# Patient Record
Sex: MALE | Race: WHITE | Employment: PART TIME | URBAN - METROPOLITAN AREA
[De-identification: names, ages, dates, MRNs, and addresses within clinical notes are randomized per-mention and may not be internally consistent; named-entity substitution may affect disease eponyms.]

---

## 2020-10-29 NOTE — ED INITIAL ASSESSMENT (HPI)
Patient took 3 tabs of LSD at 2300 and feels like his heart is racing. Denies drug use in the past. Denies alcohol this evening.  Patient is visibly anxious during triage

## 2020-10-29 NOTE — ED PROVIDER NOTES
Patient Seen in: [facility] Emergency Department      History   Patient presents with:  Arrythmia/Palpitations    Stated Complaint: heart racing    HPI    55-year-old male brought in by sister for complaints of heart racing and palpitations for th 180.3 cm (5' 11\")   Wt 61.7 kg   SpO2 96%   BMI 18.97 kg/m²         Physical Exam  Vitals signs and nursing note reviewed. HENT:      Head: Normocephalic.       Mouth/Throat:      Mouth: Mucous membranes are moist.   Eyes:      Extraocular Movements: Ext 8.9 8.5 - 10.1 mg/dL    Calculated Osmolality 273 (L) 275 - 295 mOsm/kg    GFR, Non- 110 >=60    GFR, -American 127 >=60   CBC W/ DIFFERENTIAL    Collection Time: 10/29/20  4:00 AM   Result Value Ref Range    WBC 9.7 4.0 - 11.0 x10(3 The patient already has does not have a problem list on file. to contribute to the complexity of his ED evaluation.     - pt comfortable with d/c at this time, will d/c pt home now, pt to f/u with Dr. Aura Guevara in 2 days or return to ED sooner if symptoms wo

## 2020-11-25 ENCOUNTER — OFFICE VISIT (OUTPATIENT)
Dept: OTOLARYNGOLOGY | Facility: CLINIC | Age: 25
End: 2020-11-25

## 2020-11-25 VITALS
WEIGHT: 137 LBS | SYSTOLIC BLOOD PRESSURE: 118 MMHG | BODY MASS INDEX: 19.18 KG/M2 | TEMPERATURE: 98 F | DIASTOLIC BLOOD PRESSURE: 78 MMHG | HEIGHT: 71 IN

## 2020-11-25 DIAGNOSIS — M54.2 CERVICALGIA: ICD-10-CM

## 2020-11-25 DIAGNOSIS — H60.311 ACUTE DIFFUSE OTITIS EXTERNA OF RIGHT EAR: Primary | ICD-10-CM

## 2020-11-25 PROCEDURE — 99203 OFFICE O/P NEW LOW 30 MIN: CPT | Performed by: OTOLARYNGOLOGY

## 2020-11-25 PROCEDURE — 3074F SYST BP LT 130 MM HG: CPT | Performed by: OTOLARYNGOLOGY

## 2020-11-25 PROCEDURE — 3008F BODY MASS INDEX DOCD: CPT | Performed by: OTOLARYNGOLOGY

## 2020-11-25 PROCEDURE — 3078F DIAST BP <80 MM HG: CPT | Performed by: OTOLARYNGOLOGY

## 2020-11-25 RX ORDER — IBUPROFEN 800 MG/1
800 TABLET ORAL EVERY 8 HOURS PRN
COMMUNITY
Start: 2020-11-16

## 2020-11-25 RX ORDER — NEOMYCIN SULFATE, POLYMYXIN B SULFATE AND HYDROCORTISONE 10; 3.5; 1 MG/ML; MG/ML; [USP'U]/ML
3 SUSPENSION/ DROPS AURICULAR (OTIC) 3 TIMES DAILY
Qty: 10 ML | Refills: 1 | Status: SHIPPED | OUTPATIENT
Start: 2020-11-25 | End: 2020-12-05

## 2020-11-25 RX ORDER — METHYLPREDNISOLONE 4 MG/1
TABLET ORAL
Qty: 1 PACKAGE | Refills: 0 | Status: SHIPPED | OUTPATIENT
Start: 2020-11-25

## 2020-12-02 ENCOUNTER — OFFICE VISIT (OUTPATIENT)
Dept: OTOLARYNGOLOGY | Facility: CLINIC | Age: 25
End: 2020-12-02

## 2020-12-02 VITALS
TEMPERATURE: 97 F | DIASTOLIC BLOOD PRESSURE: 80 MMHG | HEIGHT: 71 IN | WEIGHT: 137 LBS | SYSTOLIC BLOOD PRESSURE: 118 MMHG | BODY MASS INDEX: 19.18 KG/M2

## 2020-12-02 DIAGNOSIS — M54.2 CERVICALGIA: Primary | ICD-10-CM

## 2020-12-02 PROCEDURE — 99213 OFFICE O/P EST LOW 20 MIN: CPT | Performed by: OTOLARYNGOLOGY

## 2020-12-02 PROCEDURE — 3074F SYST BP LT 130 MM HG: CPT | Performed by: OTOLARYNGOLOGY

## 2020-12-02 PROCEDURE — 3079F DIAST BP 80-89 MM HG: CPT | Performed by: OTOLARYNGOLOGY

## 2020-12-02 PROCEDURE — 3008F BODY MASS INDEX DOCD: CPT | Performed by: OTOLARYNGOLOGY

## 2020-12-02 NOTE — PROGRESS NOTES
Yuval Juárez is a 22year old male. Patient presents with:   Follow - Up: Patient here for 1 week follow up regarding: Acute diffuse otitis externa of right ear, per pt slight improvement, still having ear pain        HISTORY OF PRESENT ILLNESS  11/25/202 file    Tobacco Use      Smoking status: Never Smoker      Smokeless tobacco: Never Used    Substance and Sexual Activity      Alcohol use: Not Currently      Drug use: Yes        Comment: 3 tabs of LSD      Sexual activity: Not on file    Lifestyle      P Skin Normal Inspection - Normal. No suspicious lesions bruises or masses.    Constitutional Normal Overall appearance - Normal.   Head/Face  Normal Facial features - Normal. Eyebrows - Normal. Skull - Normal.    Nasopharynx Normal External nose - Normal. further work-up and possible physical therapy for his right-sided neck pain. He agreed to allow us to access his [facility] ([facility] records so I will review these and discuss this with him when I receive these.             Paula Mukherjee MD  12/2/2020  This note w

## 2021-12-02 NOTE — PROGRESS NOTES
Health Maintenance Due   Topic Date Due   • Influenza Vaccine (1 of 2) Never done       Patient is due for topics as listed above but is not proceeding with Immunization(s) Influenza at this time. Refusal of influenza today.         Moraima Reeves is a 22year old male.  Patient presents with:  Ear Problem: Patient Presents with Right ear pain for 2 yrs, pain travels to neck       HISTORY OF PRESENT ILLNESS  11/25/2020  Patient  presents with ear pain and right-sided neck pain for 2 ye Relationship status: Not on file      Intimate partner violence        Fear of current or ex partner: Not on file        Emotionally abused: Not on file        Physically abused: Not on file        Forced sexual activity: Not on file    Other Topics palpation   Psychiatric Normal   Appropriate mood and affect. Lymph Detail Normal  no lymphadenopathy  Increased neck tension and range of motion    Eyes Normal Conjunctiva - Right: Normal, Left: Normal. Pupil - Right: Normal, Left: Normal. EOMI.  PARRIS errors may occur. When identified, these errors have been corrected.  While every attempt is made to correct errors during dictation, discrepancies may still exist

## (undated) NOTE — LETTER
12/2/2020              Camden Proctor. [address] [facility] [id number]         To whom it may concern,    Please excuse Kymberly Herrera from work today 12/2/20 as he had an appointment with me today.  Please contact our office should you